# Patient Record
Sex: MALE | Race: WHITE
[De-identification: names, ages, dates, MRNs, and addresses within clinical notes are randomized per-mention and may not be internally consistent; named-entity substitution may affect disease eponyms.]

---

## 2018-06-18 ENCOUNTER — HOSPITAL ENCOUNTER (EMERGENCY)
Dept: HOSPITAL 8 - ED | Age: 63
Discharge: LEFT BEFORE DISCHARGE | End: 2018-06-18
Payer: MEDICARE

## 2018-06-18 VITALS — HEIGHT: 67 IN | BODY MASS INDEX: 27.27 KG/M2 | WEIGHT: 173.72 LBS

## 2018-06-18 VITALS — SYSTOLIC BLOOD PRESSURE: 127 MMHG | DIASTOLIC BLOOD PRESSURE: 73 MMHG

## 2018-06-18 DIAGNOSIS — Z76.0: ICD-10-CM

## 2018-06-18 DIAGNOSIS — F32.9: Primary | ICD-10-CM

## 2018-06-18 PROCEDURE — 99281 EMR DPT VST MAYX REQ PHY/QHP: CPT

## 2018-07-12 ENCOUNTER — HOSPITAL ENCOUNTER (EMERGENCY)
Dept: HOSPITAL 8 - ED | Age: 63
Discharge: HOME | End: 2018-07-12
Payer: MEDICARE

## 2018-07-12 VITALS — HEIGHT: 66 IN | BODY MASS INDEX: 26.04 KG/M2 | WEIGHT: 162.04 LBS

## 2018-07-12 VITALS — SYSTOLIC BLOOD PRESSURE: 116 MMHG | DIASTOLIC BLOOD PRESSURE: 81 MMHG

## 2018-07-12 DIAGNOSIS — Z76.0: ICD-10-CM

## 2018-07-12 DIAGNOSIS — F20.9: Primary | ICD-10-CM

## 2018-07-12 DIAGNOSIS — F32.9: ICD-10-CM

## 2018-07-12 PROCEDURE — 99283 EMERGENCY DEPT VISIT LOW MDM: CPT

## 2018-07-12 PROCEDURE — 82962 GLUCOSE BLOOD TEST: CPT

## 2018-07-30 ENCOUNTER — HOSPITAL ENCOUNTER (EMERGENCY)
Dept: HOSPITAL 8 - ED | Age: 63
Discharge: HOME | End: 2018-07-30
Payer: MEDICARE

## 2018-07-30 VITALS — DIASTOLIC BLOOD PRESSURE: 71 MMHG | SYSTOLIC BLOOD PRESSURE: 118 MMHG

## 2018-07-30 VITALS — BODY MASS INDEX: 24.22 KG/M2 | HEIGHT: 67 IN | WEIGHT: 154.32 LBS

## 2018-07-30 DIAGNOSIS — F20.9: Primary | ICD-10-CM

## 2018-07-30 DIAGNOSIS — Z76.0: ICD-10-CM

## 2018-07-30 PROCEDURE — 99283 EMERGENCY DEPT VISIT LOW MDM: CPT

## 2018-08-05 ENCOUNTER — HOSPITAL ENCOUNTER (INPATIENT)
Facility: MEDICAL CENTER | Age: 63
LOS: 1 days | DRG: 083 | End: 2018-08-06
Attending: EMERGENCY MEDICINE | Admitting: SURGERY
Payer: MEDICARE

## 2018-08-05 ENCOUNTER — APPOINTMENT (OUTPATIENT)
Dept: RADIOLOGY | Facility: MEDICAL CENTER | Age: 63
DRG: 083 | End: 2018-08-05
Attending: SURGERY
Payer: MEDICARE

## 2018-08-05 ENCOUNTER — RESOLUTE PROFESSIONAL BILLING HOSPITAL PROF FEE (OUTPATIENT)
Dept: HOSPITALIST | Facility: MEDICAL CENTER | Age: 63
End: 2018-08-05
Payer: MEDICARE

## 2018-08-05 ENCOUNTER — APPOINTMENT (OUTPATIENT)
Dept: RADIOLOGY | Facility: MEDICAL CENTER | Age: 63
DRG: 083 | End: 2018-08-05
Attending: EMERGENCY MEDICINE
Payer: MEDICARE

## 2018-08-05 ENCOUNTER — APPOINTMENT (OUTPATIENT)
Dept: RADIOLOGY | Facility: MEDICAL CENTER | Age: 63
DRG: 083 | End: 2018-08-05
Payer: MEDICARE

## 2018-08-05 DIAGNOSIS — R56.9 SEIZURE (HCC): ICD-10-CM

## 2018-08-05 DIAGNOSIS — S06.5XAA SDH (SUBDURAL HEMATOMA) (HCC): ICD-10-CM

## 2018-08-05 PROBLEM — F20.9 SCHIZOPHRENIA (HCC): Status: ACTIVE | Noted: 2018-08-05

## 2018-08-05 PROBLEM — T14.90XA TRAUMA: Status: ACTIVE | Noted: 2018-08-05

## 2018-08-05 PROBLEM — Z53.09 CONTRAINDICATION TO ANTICOAGULATION THERAPY: Status: ACTIVE | Noted: 2018-08-05

## 2018-08-05 LAB
ABO GROUP BLD: NORMAL
ABO GROUP BLD: NORMAL
ALBUMIN SERPL BCP-MCNC: 5 G/DL (ref 3.2–4.9)
ALBUMIN/GLOB SERPL: 3.1 G/DL
ALP SERPL-CCNC: 58 U/L (ref 30–99)
ALT SERPL-CCNC: 28 U/L (ref 2–50)
ANION GAP SERPL CALC-SCNC: 12 MMOL/L (ref 0–11.9)
APTT PPP: 24.3 SEC (ref 24.7–36)
AST SERPL-CCNC: 25 U/L (ref 12–45)
BILIRUB SERPL-MCNC: 0.8 MG/DL (ref 0.1–1.5)
BLD GP AB SCN SERPL QL: NORMAL
BUN SERPL-MCNC: 10 MG/DL (ref 8–22)
CALCIUM SERPL-MCNC: 9.2 MG/DL (ref 8.5–10.5)
CHLORIDE SERPL-SCNC: 101 MMOL/L (ref 96–112)
CO2 SERPL-SCNC: 23 MMOL/L (ref 20–33)
CREAT SERPL-MCNC: 0.93 MG/DL (ref 0.5–1.4)
ERYTHROCYTE [DISTWIDTH] IN BLOOD BY AUTOMATED COUNT: 42.3 FL (ref 35.9–50)
ETHANOL BLD-MCNC: 0 G/DL
GLOBULIN SER CALC-MCNC: 1.6 G/DL (ref 1.9–3.5)
GLUCOSE SERPL-MCNC: 153 MG/DL (ref 65–99)
HCT VFR BLD AUTO: 44.9 % (ref 42–52)
HGB BLD-MCNC: 16.1 G/DL (ref 14–18)
INR PPP: 1.11 (ref 0.87–1.13)
MCH RBC QN AUTO: 32.6 PG (ref 27–33)
MCHC RBC AUTO-ENTMCNC: 35.9 G/DL (ref 33.7–35.3)
MCV RBC AUTO: 90.9 FL (ref 81.4–97.8)
PLATELET # BLD AUTO: 174 K/UL (ref 164–446)
PMV BLD AUTO: 8.8 FL (ref 9–12.9)
POTASSIUM SERPL-SCNC: 3.6 MMOL/L (ref 3.6–5.5)
PROT SERPL-MCNC: 6.6 G/DL (ref 6–8.2)
PROTHROMBIN TIME: 14 SEC (ref 12–14.6)
RBC # BLD AUTO: 4.94 M/UL (ref 4.7–6.1)
RH BLD: NORMAL
RH BLD: NORMAL
SODIUM SERPL-SCNC: 136 MMOL/L (ref 135–145)
WBC # BLD AUTO: 9 K/UL (ref 4.8–10.8)

## 2018-08-05 PROCEDURE — 86900 BLOOD TYPING SEROLOGIC ABO: CPT

## 2018-08-05 PROCEDURE — 99233 SBSQ HOSP IP/OBS HIGH 50: CPT | Performed by: SURGERY

## 2018-08-05 PROCEDURE — 700102 HCHG RX REV CODE 250 W/ 637 OVERRIDE(OP): Performed by: SURGERY

## 2018-08-05 PROCEDURE — G0390 TRAUMA RESPONS W/HOSP CRITI: HCPCS

## 2018-08-05 PROCEDURE — 70450 CT HEAD/BRAIN W/O DYE: CPT

## 2018-08-05 PROCEDURE — 94760 N-INVAS EAR/PLS OXIMETRY 1: CPT

## 2018-08-05 PROCEDURE — 85730 THROMBOPLASTIN TIME PARTIAL: CPT

## 2018-08-05 PROCEDURE — A9270 NON-COVERED ITEM OR SERVICE: HCPCS | Performed by: SURGERY

## 2018-08-05 PROCEDURE — 99291 CRITICAL CARE FIRST HOUR: CPT

## 2018-08-05 PROCEDURE — 85610 PROTHROMBIN TIME: CPT

## 2018-08-05 PROCEDURE — 85027 COMPLETE CBC AUTOMATED: CPT

## 2018-08-05 PROCEDURE — 770022 HCHG ROOM/CARE - ICU (200)

## 2018-08-05 PROCEDURE — 80307 DRUG TEST PRSMV CHEM ANLYZR: CPT

## 2018-08-05 PROCEDURE — 72125 CT NECK SPINE W/O DYE: CPT

## 2018-08-05 PROCEDURE — 80053 COMPREHEN METABOLIC PANEL: CPT

## 2018-08-05 PROCEDURE — 51798 US URINE CAPACITY MEASURE: CPT

## 2018-08-05 PROCEDURE — 86901 BLOOD TYPING SEROLOGIC RH(D): CPT

## 2018-08-05 PROCEDURE — 700111 HCHG RX REV CODE 636 W/ 250 OVERRIDE (IP): Performed by: NEUROLOGICAL SURGERY

## 2018-08-05 PROCEDURE — 86850 RBC ANTIBODY SCREEN: CPT

## 2018-08-05 PROCEDURE — 700105 HCHG RX REV CODE 258: Performed by: NEUROLOGICAL SURGERY

## 2018-08-05 RX ORDER — HALOPERIDOL 5 MG/1
10 TABLET ORAL
Status: DISCONTINUED | OUTPATIENT
Start: 2018-08-05 | End: 2018-08-06 | Stop reason: HOSPADM

## 2018-08-05 RX ORDER — HALOPERIDOL 10 MG/1
10 TABLET ORAL
COMMUNITY

## 2018-08-05 RX ORDER — ENEMA 19; 7 G/133ML; G/133ML
1 ENEMA RECTAL
Status: DISCONTINUED | OUTPATIENT
Start: 2018-08-05 | End: 2018-08-06 | Stop reason: HOSPADM

## 2018-08-05 RX ORDER — OXYCODONE HYDROCHLORIDE 5 MG/1
5 TABLET ORAL EVERY 4 HOURS PRN
Status: DISCONTINUED | OUTPATIENT
Start: 2018-08-05 | End: 2018-08-06 | Stop reason: HOSPADM

## 2018-08-05 RX ORDER — ONDANSETRON 2 MG/ML
4 INJECTION INTRAMUSCULAR; INTRAVENOUS EVERY 4 HOURS PRN
Status: DISCONTINUED | OUTPATIENT
Start: 2018-08-05 | End: 2018-08-06 | Stop reason: HOSPADM

## 2018-08-05 RX ORDER — AMOXICILLIN 250 MG
1 CAPSULE ORAL
Status: DISCONTINUED | OUTPATIENT
Start: 2018-08-05 | End: 2018-08-06 | Stop reason: HOSPADM

## 2018-08-05 RX ORDER — AMOXICILLIN 250 MG
1 CAPSULE ORAL NIGHTLY
Status: DISCONTINUED | OUTPATIENT
Start: 2018-08-05 | End: 2018-08-06 | Stop reason: HOSPADM

## 2018-08-05 RX ORDER — ACETAMINOPHEN 325 MG/1
650 TABLET ORAL EVERY 4 HOURS PRN
Status: DISCONTINUED | OUTPATIENT
Start: 2018-08-05 | End: 2018-08-06 | Stop reason: HOSPADM

## 2018-08-05 RX ORDER — BISACODYL 10 MG
10 SUPPOSITORY, RECTAL RECTAL
Status: DISCONTINUED | OUTPATIENT
Start: 2018-08-05 | End: 2018-08-06 | Stop reason: HOSPADM

## 2018-08-05 RX ORDER — DOCUSATE SODIUM 100 MG/1
100 CAPSULE, LIQUID FILLED ORAL 2 TIMES DAILY
Status: DISCONTINUED | OUTPATIENT
Start: 2018-08-05 | End: 2018-08-06 | Stop reason: HOSPADM

## 2018-08-05 RX ADMIN — SODIUM CHLORIDE 500 MG: 9 INJECTION, SOLUTION INTRAVENOUS at 12:32

## 2018-08-05 RX ADMIN — HALOPERIDOL 10 MG: 5 TABLET ORAL at 20:08

## 2018-08-05 RX ADMIN — SODIUM CHLORIDE 500 MG: 9 INJECTION, SOLUTION INTRAVENOUS at 17:49

## 2018-08-05 RX ADMIN — SERTRALINE 50 MG: 50 TABLET, FILM COATED ORAL at 09:21

## 2018-08-05 ASSESSMENT — ENCOUNTER SYMPTOMS
COUGH: 0
ABDOMINAL PAIN: 0
PHOTOPHOBIA: 0
CHILLS: 0
BLURRED VISION: 0
HEMOPTYSIS: 0
DIARRHEA: 0
BACK PAIN: 0
WEAKNESS: 0
DIAPHORESIS: 0
WHEEZING: 0
ORTHOPNEA: 0
MYALGIAS: 0
NAUSEA: 0
DOUBLE VISION: 0
FEVER: 0
PALPITATIONS: 0
CONSTIPATION: 0
SHORTNESS OF BREATH: 0
VOMITING: 0
HEARTBURN: 0
SINUS PAIN: 0
NECK PAIN: 0
WEIGHT LOSS: 0
BLOOD IN STOOL: 0

## 2018-08-05 ASSESSMENT — LIFESTYLE VARIABLES
HAVE YOU EVER FELT YOU SHOULD CUT DOWN ON YOUR DRINKING: YES
TOTAL SCORE: 4
EVER_SMOKED: YES
DOES PATIENT WANT TO STOP DRINKING: NO
EVER HAD A DRINK FIRST THING IN THE MORNING TO STEADY YOUR NERVES TO GET RID OF A HANGOVER: YES
EVER FELT BAD OR GUILTY ABOUT YOUR DRINKING: YES
ALCOHOL_USE: YES
CONSUMPTION TOTAL: INCOMPLETE
HAVE PEOPLE ANNOYED YOU BY CRITICIZING YOUR DRINKING: YES

## 2018-08-05 ASSESSMENT — COGNITIVE AND FUNCTIONAL STATUS - GENERAL
SUGGESTED CMS G CODE MODIFIER MOBILITY: CI
DAILY ACTIVITIY SCORE: 21
CLIMB 3 TO 5 STEPS WITH RAILING: A LITTLE
MOBILITY SCORE: 23
TOILETING: A LITTLE
HELP NEEDED FOR BATHING: A LITTLE
SUGGESTED CMS G CODE MODIFIER DAILY ACTIVITY: CJ
PERSONAL GROOMING: A LITTLE

## 2018-08-05 ASSESSMENT — COPD QUESTIONNAIRES
IN THE PAST 12 MONTHS DO YOU DO LESS THAN YOU USED TO BECAUSE OF YOUR BREATHING PROBLEMS: DISAGREE/UNSURE
COPD SCREENING SCORE: 4
DO YOU EVER COUGH UP ANY MUCUS OR PHLEGM?: NO/ONLY WITH OCCASIONAL COLDS OR INFECTIONS
DURING THE PAST 4 WEEKS HOW MUCH DID YOU FEEL SHORT OF BREATH: NONE/LITTLE OF THE TIME
HAVE YOU SMOKED AT LEAST 100 CIGARETTES IN YOUR ENTIRE LIFE: YES

## 2018-08-05 ASSESSMENT — PAIN SCALES - GENERAL
PAINLEVEL_OUTOF10: 0
PAINLEVEL_OUTOF10: 1
PAINLEVEL_OUTOF10: 0
PAINLEVEL_OUTOF10: 1

## 2018-08-05 NOTE — H&P
TRAUMA HISTORY AND PHYSICAL    DATE OF SERVICE: 8/5/2018    ACTIVATION LEVEL: Green.     HISTORY OF PRESENT ILLNESS: The patient is a 63 year old male with a history of schizophrenia and depression who was found down seizing on the sidewalk in [facility]. The patient was triaged as a Trauma Green in accordance with established pre hospital protocols. An expeditious primary and secondary survey with required adjuncts was conducted. See Trauma Narrator for full details.     Pt  states that he does not remember anything from August 2nd to August 5th. He reports putting down a deposit on a motel with his brother on August 2nd but does not recall anything else. Pt had difficulty following instructions throughout the interview and repeatedly circled back to the event on August 2nd.      He presented with a 2cm x 2cm cephalohematoma to his left parietotemporal scalp with superficial bleeding. Head CT showed a small right contrecoup-type subdural hematoma without midline shift. He was assessed to be in stable condition.    PAST MEDICAL HISTORY:   Past Medical History:   Diagnosis Date   • Back pain    • Lumbar disc disease    • Psychiatric disorder     depression, paranoid schizophrenia.          PAST SURGICAL HISTORY: History reviewed. No pertinent surgical history.       ALLERGIES: Pcn [penicillins]       CURRENT MEDICATIONS:   Outpatient Prescriptions Marked as Taking for the 8/5/18 encounter (Hospital Encounter)   Medication Sig   • sertraline (ZOLOFT) 50 MG Tab Take 50 mg by mouth every day.   • haloperidol (HALDOL) 10 MG tablet Take 10 mg by mouth Once.         FAMILY HISTORY:   Reviewed and found to be non-contributory in regards to the above presentation    SOCIAL HISTORY:  reports that he has quit smoking. He has never used smokeless tobacco. He reports that he drinks alcohol. He reports that he does not use drugs.  Used to smoke 1PPD.    REVIEW OF SYSTEMS:   Review of Systems:  Constitutional: Negative for  fever, chills, weight loss, malaise/fatigue and diaphoresis.   HENT: Negative for hearing loss, ear pain, nosebleeds, congestion, sore throat, neck pain, tinnitus and ear discharge.    Eyes: Negative for blurred vision, double vision, photophobia, pain, discharge and redness.   Respiratory: Negative for cough, hemoptysis, sputum production, shortness of breath, wheezing and stridor.    Cardiovascular: Negative for chest pain, palpitations, orthopnea, claudication, leg swelling and PND.   Gastrointestinal: Negative for heartburn, nausea, vomiting, abdominal pain, diarrhea, constipation, blood in stool and melena.   Genitourinary: Negative for dysuria, urgency, frequency, hematuria and flank pain.   Musculoskeletal: Negative for myalgias, back pain, joint pain and falls.   Skin: Negative for itching and rash.  Neurological: Negative for dizziness, tingling, tremors, sensory change, speech change, focal weakness, seizures, loss of consciousness, weakness and headaches.   Endo/Heme/Allergies: Negative for environmental allergies and polydipsia. Does not bruise/bleed easily.   Psychiatric/Behavioral: Negative for depression, suicidal ideas, hallucinations, memory loss and substance abuse. The patient is not nervous/anxious and does not have insomnia.      PHYSICAL EXAMINATION:     GENERAL:  Otherwise healthy-appearing and in no acute distress    HEENT:    · HEAD: Small, non-expanding cephalohematoma  · EARS: Normal pinna bilaterally.  External auditory canals are without discharge. No hemotympanum.   · EYES: Conjunctivae and sclerae are clear. Extraocular movements are full. Pupils are equal, round, and reactive to light.    · NOSE: No rhinorrhea  · THROAT: Oral mucosa is moist.    FACE: The midface and jaw are stable. No malocclusion of bite is evident on visual inspection    NECK:  Soft and supple without lymphadenopathy. No masses are noted.  Trachea is midline.   There is no cervical crepitance. Palpation of the  posterior bony spine demonstrates no midline tenderness.     CHEST:  Lungs are clear to auscultation bilaterally. Symmetrical rise with respiration.  No chest wall tenderness or instability.  No crepitance.  No wounds, lacerations, or excoriations.    CARDIOVASCULAR:  Regular rate and rhythm.  No jugulo-venous distention.  Palpable pulses present in all four extremities.      ABDOMEN:  Soft, non-tender, non-distended.  Non-tympanitic.  No wounds, lacerations, or excoriations.    BACK/PELVIS:    · Thoracic Vertebrae - NON tender with palpation, no stepoffs.  · Lumbar Vertebrae - NON tender with palpation, no stepoffs.  · Sacrum - NON tender with palpation  · Pelvic Wings - NON tender with palpation    RECTAL:  Deferred    GENITOURINARY:  The patient has normal external reproductive anatomy.    EXTREMITIES:  · RIGHT ARM: Without deformities, wounds, lacerations, or excoriations.  Full passive and active range of motion without pain.  · LEFT ARM: Without deformities, wounds, lacerations, or excoriations.  Full passive and active range of motion without pain.  · RIGHT LEG: Without deformities, wounds, lacerations, or excoriations.  Full passive and active range of motion without pain.  · LEFT LEG: Without deformities, wounds, lacerations, or excoriations.  Full passive and active range of motion without pain.    NEUROLOGIC:  Sheila Coma Score 15. Cranial nerves II through XII are grossly intact. Motor and sensory exams are normal in all four extremities. Motor and sensory reflexes are 2+ and symmetric with bilateral plantar responses.    PSYCHIATRIC: Affect and mood is appropriate for age and condition.    LABORATORY VALUES:   Recent Labs      08/05/18   0613   WBC  9.0   RBC  4.94   HEMOGLOBIN  16.1   HEMATOCRIT  44.9   MCV  90.9   MCH  32.6   MCHC  35.9*   RDW  42.3   PLATELETCT  174   MPV  8.8*     Recent Labs      08/05/18   0613   SODIUM  136   POTASSIUM  3.6   CHLORIDE  101   CO2  23   GLUCOSE  153*   BUN  10    CREATININE  0.93   CALCIUM  9.2     Recent Labs      08/05/18   0613   ASTSGOT  25   ALTSGPT  28   TBILIRUBIN  0.8   ALKPHOSPHAT  58   GLOBULIN  1.6*   INR  1.11     Recent Labs      08/05/18   0613   APTT  24.3*   INR  1.11        IMAGING:   CT-CSPINE WITHOUT PLUS RECONS   Final Result      No acute fracture or listhesis of the cervical spine.      CT-HEAD W/O   Final Result      1.  Contusion of the left parietal scalp with a small contrecoup subdural hematoma of the right frontal convexity. No significant local mass effect or midline shift.   2.  No skull fractures. No CT evidence of acute infarct or mass.      CT-HEAD W/O    (Results Pending)       IMPRESSION AND PLAN:     Active Hospital Problems    Diagnosis   • Subdural hematoma (HCC) [I62.00]     Priority: High     Contusion of the left parietal scalp with a small contrecoup subdural hematoma of the right frontal convexity. No significant local mass effect or midline shift.  Follow up CT 6 hours  Keppra x 7 days  Definitive plan pending.  Franc Angeles MD. Neurosurgery.       • Seizure (HCC) [R56.9]     Priority: High     No known history   Admit BA 0.0  Work up in progress     • Contraindication to anticoagulation therapy [Z53.09]     Priority: Medium     Systemic anticoagulation contraindicated secondary to elevated bleeding risk.  Consider duplex if Lovenox not initiated within 48 hours of admission      • Trauma [T14.90XA]     Priority: Low     Found down seizing  Trauma Green activation.     • Schizophrenia (HCC) [F20.9]     Priority: Low     Chronic condition treated with Zoloft and Haldol.  Resumed maintenance medication.         DISPOSITION:  ICU.    Aggregated care time spent evaluating, reviewing documentation, providing care, and managing this patient exclusive of procedures: 40 minutes  ____________________________________   Jaycob GUNDERSON / BRIGHT     DD: 8/5/2018   DT: 8:46 AM

## 2018-08-05 NOTE — ED NOTES
ERP at bedside for patient evaluation. Patient A&O x4 and talking with provider appropriately. VSS

## 2018-08-05 NOTE — CONSULTS
DATE OF SERVICE:  08/05/2018    NEUROSURGICAL CONSULTATION    HISTORY OF PRESENT ILLNESS:  The patient is a pleasant 63-year-old male with a   history of paranoid schizophrenia, alcohol abuse and drug abuse who was found   seizing, brought to the hospital, found to have a cephalohematoma and a   subdural hematoma and neurosurgical consultation was requested.  On further   questioning, the patient has no recollection of the event.  He is complaining   of headache.  No nausea or vomiting.    PAST MEDICAL HISTORY:  As above.  Back pain as well.    PAST SURGICAL HISTORY:  None.    ALLERGIES:  PENICILLIN.    MEDICATIONS AT HOME:  Haldol and Zoloft.    SOCIAL HISTORY:  Quit smoking.  He drinks a significant amount.  No drugs now,   but he has been using crack cocaine he said recently.  VITAL SIGNS:  AVSS.  GENERAL:  A and O x3.  GCS of 15.  HEENT:  Pupils equal, round, reactive to light.  Extraocular muscles intact.    Tongue midline.  Face symmetric.  NEUROLOGIC:  Motor is 5/5 strength in all muscle groups in the upper and lower   extremities.  Sensory grossly intact to light touch.    IMAGING STUDIES:  CT scan of the brain noncontrast shows right-sided very,   very thin frontotemporal subdural with no mass effect, no shift, measuring   maybe 2 mm in thickness.  No fracture.    LABORATORY VALUES:  CBC within normal limits.  Basic metabolic panel within   normal limits, except for glucose of 153.  ETOH negative.  INR and PTT are   normal.    PLAN:  Repeat CT at 11:00 a.m.  Keppra 500 b.i.d. x7 days.  If repeat CT scan   is stable, the patient can safely go to the floor and have a diet and work   with PT and OT with the expectation that he may be able to be discharged   tomorrow or Tuesday.  We will follow closely.       ____________________________________     LUCRECIA HUMMEL MD    CPD / NTS    DD:  08/05/2018 10:46:25  DT:  08/05/2018 10:55:48    D#:  [id number]  Job#:  [id number]

## 2018-08-05 NOTE — PROGRESS NOTES
Transport at bedside to bring pt to CT. Pt ambulated and assisted to wheelchair, on transport monitor, and chart in tow.     CT transport uneventful. Pt arrival back to S-115 at 1130 back in bed and denies further needs at this time.

## 2018-08-05 NOTE — CARE PLAN
Problem: Safety  Goal: Will remain free from falls  Outcome: PROGRESSING AS EXPECTED  RN to educate pt on use of call light and bed alarm to keep pt safe and free from falls. RN to ensure all comfort measures are met such as hydration, positioning, and temperature. RN to encourage pt to use call light to alert RN of any further needs.     Problem: Pain Management  Goal: Pain level will decrease to patient's comfort goal  Outcome: PROGRESSING AS EXPECTED  RN to educate pt on pain assessment and optional interventions to help increase pt comfort. Pt denies any need for intervention at this time as pain is at a minimal level and comfort. RN to reassess with pt rounding.

## 2018-08-05 NOTE — PROGRESS NOTES
Pt belongings documented in chart. Pt alert and aware of belongings and agrees to documentation on paper. Safe keeping called to keep pt's cash safe during stay. Security called to keep cigarettes, marijuana, and roll papers during stay.

## 2018-08-05 NOTE — ED NOTES
Assuming care of patient in bed 15. Patient A&O x3 to place person, and time. Patient denies hx of seizures. Denies drug or alcohol use tonight. Patient does not know what happened. Patient remains in c-collar. Monitors in place.

## 2018-08-05 NOTE — PROGRESS NOTES
"  Trauma/Surgical Progress Note    Author: Pablo SHADY Hutchinsonkimberly Date & Time created: 8/5/2018   3:14 PM     Interval Events:  63 yom s/p seizure, fall and TBI  Hospital day #1  Pt currently requires ICU care  Seen on rounds and discussed with multidisciplinary team  Physiologic derangements preclude floor transfer  Events and interventions include  Supportive care for TBI  Nutritional support  Pain mgt  Pulmonary toilet  Review of Systems   Unable to perform ROS: psychiatric disorder     Hemodynamics:  Blood pressure 121/66, pulse (!) 58, temperature 36.3 °C (97.4 °F), resp. rate 18, height 1.676 m (5' 6\"), weight 67 kg (147 lb 11.3 oz), SpO2 99 %.     Respiratory:    Respiration: 18, Pulse Oximetry: 99 %           Fluids:    Intake/Output Summary (Last 24 hours) at 08/05/18 1514  Last data filed at 08/05/18 1400   Gross per 24 hour   Intake              980 ml   Output              650 ml   Net              330 ml     Admit Weight: 68 kg (150 lb)  Current Weight: 67 kg (147 lb 11.3 oz)    Physical Exam   Constitutional: He is oriented to person, place, and time. He appears well-developed and well-nourished. No distress.   HENT:   Head: Normocephalic and atraumatic.   Eyes: Pupils are equal, round, and reactive to light.   Neck: Normal range of motion. Neck supple. No tracheal deviation present.   Cardiovascular: Normal rate, regular rhythm and normal heart sounds.    Pulmonary/Chest: Effort normal and breath sounds normal. No respiratory distress.   Abdominal: Soft. Bowel sounds are normal. He exhibits no distension.   Musculoskeletal: Normal range of motion. He exhibits no edema or deformity.   Neurological: He is alert and oriented to person, place, and time.   Skin: Skin is warm and dry.   Psychiatric:   Unable to assess       Medical Decision Making/Problem List:    Active Hospital Problems    Diagnosis   • Subdural hematoma (HCC) [I62.00]     Priority: High     Contusion of the left parietal scalp with a small " contrecoup subdural hematoma of the right frontal convexity. No significant local mass effect or midline shift.  Follow up CT -stable with some blossoming of contusions  Keppra x 7 days  Non-operative mgt  Franc Angeles MD. Neurosurgery.       • Seizure (HCC) [R56.9]     Priority: High     No known history  Admit BA 0.0  Likely precipitated by blunt head trauma  Keppra x7 days     • Contraindication to anticoagulation therapy [Z53.09]     Priority: Medium     Systemic anticoagulation contraindicated secondary to elevated bleeding risk.  Consider duplex if Lovenox not initiated within 48 hours of admission      • Trauma [T14.90XA]     Priority: Low     Found down seizing  Trauma Green activation.     • Schizophrenia (HCC) [F20.9]     Priority: Low     Chronic condition treated with Zoloft and Haldol.  Resumed maintenance medication.       Core Measures & Quality Metrics:  Medications reviewed, Labs reviewed and Radiology images reviewed  Arora catheter: Critically Ill - Requiring Accurate Measurement of Urinary Output      DVT Prophylaxis: Contraindicated - High bleeding risk    Ulcer prophylaxis: Not indicated        EDWARD Score  Discussed patient condition with RN, RT and Pharmacy.      Assessment/Plan  No new Assessment & Plan notes have been filed under this hospital service since the last note was generated.  Service: Surgery General

## 2018-08-05 NOTE — PROGRESS NOTES
Patient arrived to S-115. Patient settled in room. Denies pain. Called brother of the patient to update him per the patients request.     2 RN skin check completed. Photos taken of head abrasion/swelling. Blanchable redness noted on sacrum/buttox. Mepilex placed.     Call bell within reach. Patient denies any other needs at this time.

## 2018-08-05 NOTE — ED PROVIDER NOTES
"ED Provider Note    CHIEF COMPLAINT  Chief Complaint   Patient presents with   • Trauma Green     GLF / head injury       HPI  Chandni Hess is a 63 y.o. male who presents as a trauma green.  The patient was found seizing on the sidewalk in [facility].  Per medics he is now awake and alert.  Denies any history of seizure disorder.  He has no recollection as far as what happened.  He has no complaints of pain.  He states he is not a drinker.  Medics do not know how many or how long the seizure activity lasted.    REVIEW OF SYSTEMS  See HPI for further details. All other systems negative.    PAST MEDICAL HISTORY  No past medical history on file.    FAMILY HISTORY  No family history on file.    SOCIAL HISTORY  Social History     Social History   • Marital status: N/A     Spouse name: N/A   • Number of children: N/A   • Years of education: N/A     Social History Main Topics   • Smoking status: Not on file   • Smokeless tobacco: Not on file   • Alcohol use Not on file   • Drug use: Unknown   • Sexual activity: Not on file     Other Topics Concern   • Not on file     Social History Narrative   • No narrative on file       SURGICAL HISTORY  No past surgical history on file.    CURRENT MEDICATIONS  Home Medications    **Home medications have not yet been reviewed for this encounter**         ALLERGIES  Allergies not on file    PHYSICAL EXAM  VITAL SIGNS: /75   Pulse 83   Temp (!) 35.6 °C (96.1 °F)   Resp (!) 22   Ht 1.676 m (5' 6\")   Wt 68 kg (150 lb)   SpO2 97%   BMI 24.21 kg/m²   Constitutional: Well developed, Well nourished, No acute distress, Non-toxic appearance.   HENT: Normocephalic, abrasion and slight swelling to the left occipital region, Oropharynx moist with no trauma,   Eyes: PERRL, EOMI, Conjunctiva normal, No discharge.   Neck: Trachea is midline and c-collar is left in place.  Cardiovascular: Normal heart rate, Normal rhythm, No murmurs, No rubs, No gallops.   Thorax & Lungs: Clear to " auscultation without wheezes, rales, or rhonchi. No chest tenderness.  Pelvis is stable and nontender.  Abdomen: Soft nontender.  Skin: Warm, Dry.  Musculoskeletal: Good range of motion in all major joints. No tenderness to palpation or major deformities noted.   Neurologic: Awake and alert but amnestic for events surrounding his seizure, Normal motor function, Normal sensory function, No focal deficits noted.       RADIOLOGY/PROCEDURES  CT-CSPINE WITHOUT PLUS RECONS   Final Result      No acute fracture or listhesis of the cervical spine.      CT-HEAD W/O   Final Result      1.  Contusion of the left parietal scalp with a small contrecoup subdural hematoma of the right frontal convexity. No significant local mass effect or midline shift.   2.  No skull fractures. No CT evidence of acute infarct or mass.            COURSE & MEDICAL DECISION MAKING  Pertinent Labs & Imaging studies reviewed. (See chart for details)  This is a 63-year-old here for evaluation after being found on the sidewalk having a seizure.  On exam he is neurologically intact.  He is amnestic for events surrounding the seizure.  He states he has no history of seizure disorder.  He states he is not a drinker.  At this point he states he feels absolutely fine.  I was contacted by the radiologist and we have discussed and reviewed his CT scan of his head.  This shows a left occipital region scalp hematoma and a right sided contrecoup subdural hematoma.  Cervical spine shows no evidence of acute abnormalities.  Laboratory workup shows alcohol to be 0.  Chemistries are normal with the exception of a glucose of 153.  INR is normal.  CBC shows normal white count with normal hemoglobin and platelet count.  I discussed the results of the studies with the patient.  He understands that he will require admission.  I have contacted Dr. Angeles who is on-call for neurosurgery and he will consult on the case.  He is requested the patient be admitted to the trauma  service.  I will discuss the case with the trauma surgeon for admission to the trauma ICU.    FINAL IMPRESSION  1.  Seizure  2.  Subdural hematoma  3.  Scalp hematoma         Electronically signed by: Adolph Barlow, 8/5/2018 6:15 AM

## 2018-08-05 NOTE — NON-PROVIDER
TRAUMA HISTORY AND PHYSICAL    DATE OF SERVICE: 8/5/2018    ACTIVATION LEVEL: Green.     HISTORY OF PRESENT ILLNESS: The patient is a 63 year old male with a history of schizophrenia and depression who was found down seizing on the sidewalk in [facility]. The patient was triaged as a Trauma Green in accordance with established pre hospital protocols. An expeditious primary and secondary survey with required adjuncts was conducted. See Trauma Narrator for full details.    Pt  states that he does not remember anything from August 2nd to August 5th. He reports putting down a deposit on a motel with his brother on August 2nd but does not recall anything else. Pt had difficulty following instructions throughout the interview and repeatedly circled back to the event on August 2nd.     He presented with a 2cm x 2cm cephalohematoma to his left parietotemporal scalp with superficial bleeding. Head CT showed a small right contrecoup-type subdural hematoma without midline shift. He was assessed to be in stable condition.    PAST MEDICAL HISTORY:   Past Medical History:   Diagnosis Date   • Back pain    • Lumbar disc disease    • Psychiatric disorder     depression, paranoid schizophrenia.          PAST SURGICAL HISTORY: History reviewed. No pertinent surgical history.       ALLERGIES: Pcn [penicillins]  Unable to obtain due to patient condition and No significant history of allergies     CURRENT MEDICATIONS:   Outpatient Prescriptions Marked as Taking for the 8/5/18 encounter (Hospital Encounter)   Medication Sig   • sertraline (ZOLOFT) 50 MG Tab Take 50 mg by mouth every day.   • haloperidol (HALDOL) 10 MG tablet Take 10 mg by mouth Once.         FAMILY HISTORY:   Reviewed and found to be non-contributory in regards to the above presentation    SOCIAL HISTORY:   Pt denied a recent hx of alcohol use but endorsed pack per day tobacco smoking and frequent marijuana use.    REVIEW OF SYSTEMS:   Review of Systems    Constitutional: Negative for chills, diaphoresis, fever, malaise/fatigue and weight loss.   HENT: Negative for congestion, ear discharge, ear pain, nosebleeds and sinus pain.    Eyes: Negative for blurred vision, double vision and photophobia.   Respiratory: Negative for cough, hemoptysis, shortness of breath and wheezing.    Cardiovascular: Negative for chest pain, palpitations and orthopnea.   Gastrointestinal: Negative for abdominal pain, blood in stool, constipation, diarrhea, heartburn, nausea and vomiting.   Genitourinary: Negative for dysuria, frequency and urgency.   Musculoskeletal: Negative for back pain, joint pain, myalgias and neck pain.   Skin: Negative for itching and rash.   Neurological: Negative for weakness.         PHYSICAL EXAMINATION:     GENERAL:  Otherwise healthy-appearing and in no acute distress    HEENT:    · HEAD: 2x2cm left parietotemporal cephalohematoma with superficial abraisions, normocephalic.    · EARS: Normal pinna bilaterally.  External auditory canals are without discharge. No hemotympanum.   · EYES: Conjunctivae and sclerae are clear. Extraocular movements are full. Pupils are equal, round, and reactive to light.    · NOSE: No rhinorrhea  · THROAT: Oral mucosa is moist.    FACE: The midface and jaw are stable. No malocclusion of bite is evident on visual inspection    NECK:  Soft and supple without lymphadenopathy. No masses are noted.  Trachea is midline.  There is no cervical crepitance. Palpation of the posterior bony spine demonstrates no midline tenderness.     CHEST:  Lungs are clear to auscultation bilaterally. Symmetrical rise with respiration.  No chest wall tenderness or instability.  No crepitance.  No wounds, lacerations, or excoriations.    CARDIOVASCULAR:  Regular rate and rhythm.  No jugulo-venous distention.  Palpable pulses present in all four extremities.      ABDOMEN:  Soft, non-tender, non-distended.  Non-tympanitic.  No wounds, lacerations, or  excoriations.    BACK/PELVIS:    · Thoracic Vertebrae - non tender with palpation, no stepoffs.  · Lumbar Vertebrae - non tender with palpation, no stepoffs.  · Sacrum - non tender with palpation  · Pelvic Wings - non tender with palpation    RECTAL:  Deferred    GENITOURINARY:  The patient has normal external reproductive anatomy.    EXTREMITIES:  · RIGHT ARM: Without deformities, wounds, lacerations, or excoriations.  Full passive and active range of motion without pain.  · LEFT ARM: Without deformities, wounds, lacerations, or excoriations.  Full passive and active range of motion without pain.  · RIGHT LEG: Without deformities, wounds, lacerations, or excoriations.  Full passive and active range of motion without pain.  · LEFT LEG: Without deformities, wounds, lacerations, or excoriations.  Full passive and active range of motion without pain.    NEUROLOGIC:  Sheila Coma Score 15. Cranial nerves II through XII are grossly intact. Motor and sensory exams are normal in all four extremities. Motor and sensory reflexes are 2+ and symmetric with bilateral plantar responses. Pt is alert and oriented.    PSYCHIATRIC: Affect and mood is appropriate for age and condition.    LABORATORY VALUES:   Recent Labs      08/05/18 0613   WBC  9.0   RBC  4.94   HEMOGLOBIN  16.1   HEMATOCRIT  44.9   MCV  90.9   MCH  32.6   MCHC  35.9*   RDW  42.3   PLATELETCT  174   MPV  8.8*     Recent Labs      08/05/18 0613   SODIUM  136   POTASSIUM  3.6   CHLORIDE  101   CO2  23   GLUCOSE  153*   BUN  10   CREATININE  0.93   CALCIUM  9.2     Recent Labs      08/05/18 0613   ASTSGOT  25   ALTSGPT  28   TBILIRUBIN  0.8   ALKPHOSPHAT  58   GLOBULIN  1.6*   INR  1.11     Recent Labs      08/05/18 0613   APTT  24.3*   INR  1.11        IMAGING:   CT-CSPINE WITHOUT PLUS RECONS   Final Result      No acute fracture or listhesis of the cervical spine.      CT-HEAD W/O   Final Result      1.  Contusion of the left parietal scalp with a small  contrecoup subdural hematoma of the right frontal convexity. No significant local mass effect or midline shift.   2.  No skull fractures. No CT evidence of acute infarct or mass.          IMPRESSION AND PLAN:     Active Hospital Problems    Diagnosis   • Subdural hematoma (HCC) [I62.00]     Priority: High     Contusion of the left parietal scalp with a small contrecoup subdural hematoma of the right frontal convexity. No significant local mass effect or midline shift.  Follow up CT 6 hours  Keppra x 7 days  Definitive plan pending.  Franc Angeles MD. Neurosurgery.       • Seizure (HCC) [R56.9]     Priority: High     No known history   Admit BA 0.0  Work up in progress     • Contraindication to anticoagulation therapy [Z53.09]     Priority: Medium     Systemic anticoagulation contraindicated secondary to elevated bleeding risk.  Consider duplex if Lovenox not initiated within 48 hours of admission      • Trauma [T14.90XA]     Priority: Low     Found down seizing  Trauma Green activation.     • Schizophrenia (HCC) [F20.9]     Priority: Low     Chronic condition treated with Zoloft and Haldol.  Resumed maintenance medication.       Pt is a 63 year old male with hx significant for schizophrenia and depression who presented to the ED as a trauma green after being found down seizing on the sidewalk in [facility]. Pt physical exam was significant for 2x2cm left cephalohematoma. Subsequent head CT showed a small right subdural hematoma without midline shift. Pt does not appear to have depressed mental status or neurologic deficits secondary to this injury.    Plan  Acute right subdural hematoma w/o midline shift  -admit to [facility] for Q[facility] neuro checks  -f/u CT head in 4-6hrs to assess stability of bleed  -Keppra 500mg BID for seizure prophylaxis    Schizophrenia  -no indication for psych consult at this time  -restart haloperidol and zoloft      DISPOSITION:  SICU for close neurologic monitoring.

## 2018-08-05 NOTE — ED NOTES
Patient resting comfortably w/ VSS; denies needs at this time, updated on POC; awaiting bed assignment.

## 2018-08-05 NOTE — PROGRESS NOTES
Re-attempt made to call and update pt's brother, Scot on pt's current status per pt. No answer. Voicemail left with direct number to pod.

## 2018-08-06 VITALS
HEIGHT: 66 IN | HEART RATE: 64 BPM | SYSTOLIC BLOOD PRESSURE: 121 MMHG | RESPIRATION RATE: 18 BRPM | TEMPERATURE: 97.9 F | BODY MASS INDEX: 23.84 KG/M2 | DIASTOLIC BLOOD PRESSURE: 66 MMHG | OXYGEN SATURATION: 97 % | WEIGHT: 148.37 LBS

## 2018-08-06 LAB
ANION GAP SERPL CALC-SCNC: 9 MMOL/L (ref 0–11.9)
BASOPHILS # BLD AUTO: 0.3 % (ref 0–1.8)
BASOPHILS # BLD: 0.03 K/UL (ref 0–0.12)
BUN SERPL-MCNC: 9 MG/DL (ref 8–22)
CALCIUM SERPL-MCNC: 9.2 MG/DL (ref 8.5–10.5)
CHLORIDE SERPL-SCNC: 103 MMOL/L (ref 96–112)
CO2 SERPL-SCNC: 24 MMOL/L (ref 20–33)
CREAT SERPL-MCNC: 0.65 MG/DL (ref 0.5–1.4)
EOSINOPHIL # BLD AUTO: 0.21 K/UL (ref 0–0.51)
EOSINOPHIL NFR BLD: 2.4 % (ref 0–6.9)
ERYTHROCYTE [DISTWIDTH] IN BLOOD BY AUTOMATED COUNT: 42.5 FL (ref 35.9–50)
GLUCOSE SERPL-MCNC: 109 MG/DL (ref 65–99)
HCT VFR BLD AUTO: 47.1 % (ref 42–52)
HGB BLD-MCNC: 16.4 G/DL (ref 14–18)
IMM GRANULOCYTES # BLD AUTO: 0.04 K/UL (ref 0–0.11)
IMM GRANULOCYTES NFR BLD AUTO: 0.5 % (ref 0–0.9)
LYMPHOCYTES # BLD AUTO: 2.02 K/UL (ref 1–4.8)
LYMPHOCYTES NFR BLD: 23.2 % (ref 22–41)
MCH RBC QN AUTO: 31.7 PG (ref 27–33)
MCHC RBC AUTO-ENTMCNC: 34.8 G/DL (ref 33.7–35.3)
MCV RBC AUTO: 90.9 FL (ref 81.4–97.8)
MONOCYTES # BLD AUTO: 0.51 K/UL (ref 0–0.85)
MONOCYTES NFR BLD AUTO: 5.9 % (ref 0–13.4)
NEUTROPHILS # BLD AUTO: 5.89 K/UL (ref 1.82–7.42)
NEUTROPHILS NFR BLD: 67.7 % (ref 44–72)
NRBC # BLD AUTO: 0 K/UL
NRBC BLD-RTO: 0 /100 WBC
PLATELET # BLD AUTO: 169 K/UL (ref 164–446)
PMV BLD AUTO: 8.9 FL (ref 9–12.9)
POTASSIUM SERPL-SCNC: 4.2 MMOL/L (ref 3.6–5.5)
RBC # BLD AUTO: 5.18 M/UL (ref 4.7–6.1)
SODIUM SERPL-SCNC: 136 MMOL/L (ref 135–145)
WBC # BLD AUTO: 8.7 K/UL (ref 4.8–10.8)

## 2018-08-06 PROCEDURE — G9167 ATTEN D/C STATUS: HCPCS | Mod: CI

## 2018-08-06 PROCEDURE — 700112 HCHG RX REV CODE 229: Performed by: SURGERY

## 2018-08-06 PROCEDURE — 92523 SPEECH SOUND LANG COMPREHEN: CPT

## 2018-08-06 PROCEDURE — A9270 NON-COVERED ITEM OR SERVICE: HCPCS | Performed by: SURGERY

## 2018-08-06 PROCEDURE — G9165 ATTEN CURRENT STATUS: HCPCS | Mod: CI

## 2018-08-06 PROCEDURE — 700105 HCHG RX REV CODE 258: Performed by: NEUROLOGICAL SURGERY

## 2018-08-06 PROCEDURE — 700102 HCHG RX REV CODE 250 W/ 637 OVERRIDE(OP): Performed by: SURGERY

## 2018-08-06 PROCEDURE — 85025 COMPLETE CBC W/AUTO DIFF WBC: CPT

## 2018-08-06 PROCEDURE — 80048 BASIC METABOLIC PNL TOTAL CA: CPT

## 2018-08-06 PROCEDURE — G9166 ATTEN GOAL STATUS: HCPCS | Mod: CI

## 2018-08-06 PROCEDURE — 700111 HCHG RX REV CODE 636 W/ 250 OVERRIDE (IP): Performed by: NEUROLOGICAL SURGERY

## 2018-08-06 RX ORDER — LEVETIRACETAM 500 MG/1
500 TABLET ORAL 2 TIMES DAILY
Qty: 12 TAB | Refills: 0 | Status: SHIPPED | OUTPATIENT
Start: 2018-08-06 | End: 2018-08-12

## 2018-08-06 RX ORDER — LEVETIRACETAM 500 MG/1
500 TABLET ORAL 2 TIMES DAILY
Status: DISCONTINUED | OUTPATIENT
Start: 2018-08-06 | End: 2018-08-06 | Stop reason: HOSPADM

## 2018-08-06 RX ADMIN — DOCUSATE SODIUM 100 MG: 100 CAPSULE, LIQUID FILLED ORAL at 04:58

## 2018-08-06 RX ADMIN — SODIUM CHLORIDE 500 MG: 9 INJECTION, SOLUTION INTRAVENOUS at 04:58

## 2018-08-06 RX ADMIN — SERTRALINE 50 MG: 50 TABLET, FILM COATED ORAL at 04:58

## 2018-08-06 ASSESSMENT — ENCOUNTER SYMPTOMS
RESPIRATORY NEGATIVE: 1
CONSTITUTIONAL NEGATIVE: 1
HEADACHES: 0
PSYCHIATRIC NEGATIVE: 1
DIZZINESS: 0
MUSCULOSKELETAL NEGATIVE: 1

## 2018-08-06 ASSESSMENT — PAIN SCALES - GENERAL
PAINLEVEL_OUTOF10: 0

## 2018-08-06 NOTE — PROGRESS NOTES
Neurosurgery Progress Note    Subjective:  Doing well, no acute events, up in chair, eating, doan    Exam:  Aaox3, perrl, eom's intact, face sym, tongue ML, str 5/5, no drift    Pulse  Av  Min: 45  Max: 70  Resp  Av.4  Min: 12  Max: 27  Temp  Av.7 °C (98.1 °F)  Min: 36.3 °C (97.4 °F)  Max: 37.3 °C (99.1 °F)  SpO2  Av.2 %  Min: 92 %  Max: 100 %    No Data Recorded    Recent Labs      18   0618   0515   WBC  9.0  8.7   RBC  4.94  5.18   HEMOGLOBIN  16.1  16.4   HEMATOCRIT  44.9  47.1   MCV  90.9  90.9   MCH  32.6  31.7   MCHC  35.9*  34.8   RDW  42.3  42.5   PLATELETCT  174  169   MPV  8.8*  8.9*     Recent Labs      1813  18   0515   SODIUM  136  136   POTASSIUM  3.6  4.2   CHLORIDE  101  103   CO2  23  24   GLUCOSE  153*  109*   BUN  10  9   CREATININE  0.93  0.65   CALCIUM  9.2  9.2     Recent Labs      1813   APTT  24.3*   INR  1.11           Intake/Output       18 0700 - 1859 18 0700 - 18 0659      2810-6535 0497-9533 Total  5172-2741 Total       Intake    P.O.  1320  240 1560  --  -- --    P.O. 8339 469 6997 -- -- --    I.V.  200  100 300  --  -- --    IV Piggyback Volume (Keppra) 200 100 300 -- -- --    Total Intake 3626 760 9930 -- -- --       Output    Urine  1500  1465 2965  --  -- --    Output (mL) (Urinary Catheter Indwelling Catheter 16 fr) 1500 1465 2965 -- -- --    Stool  0  -- 0  --  -- --    Number of Times Stooled 1 x -- 1 x -- -- --    Measurable Stool (mL) 0 -- 0 -- -- --    Total Output 1500 1465 2965 -- -- --       Net I/O     20 -6924 -1109 -- -- --            Intake/Output Summary (Last 24 hours) at 18 0807  Last data filed at 18 0600   Gross per 24 hour   Intake             1860 ml   Output             2965 ml   Net            -1105 ml       $ Bladder Scan Results (mL): 776    • Pharmacy Consult Request  1 Each PRN   • docusate sodium  100 mg BID   • senna-docusate  1 Tab Nightly    • senna-docusate  1 Tab Q24HRS PRN   • bisacodyl  10 mg Q24HRS PRN   • fleet  1 Each Once PRN   • acetaminophen  650 mg Q4HRS PRN   • oxyCODONE immediate-release  5 mg Q4HRS PRN   • ondansetron  4 mg Q4HRS PRN   • haloperidol  10 mg QHS   • sertraline  50 mg QAM   • levETIRAcetam (KEPPRA) IV  500 mg Q12HRS       Assessment and Plan:  Hospital day #1  right-sided very, very thin frontotemporal subdural with no mass effect, no shift, measuring   maybe 2 mm in thickness.  No fracture.  POD #n/a    Doing well  Ok to transfer to floor from NS standpoint  Home when cleared by trauma  F/u in our office in 2 weeks w/ repeat head CT  keppra 500mg BID x 7 days  No ASA, NSAIDs, anticoags, antiplatelets    ATTENDING ADDENDUM:  Patient seen independently and agree with above note  proph lovenox ok tonight

## 2018-08-06 NOTE — PROGRESS NOTES
RN attempted to call and update Pt brother, Scot, with no answer.  RN will continue to try and contact

## 2018-08-06 NOTE — DISCHARGE INSTRUCTIONS
Discharge Instructions    Discharged to bus stop by registered nurse with wheelchair. Discharged via wheel chair, hospital escort: registered nurse.  Special equipment needed: none  Be sure to schedule a follow-up appointment with your primary care doctor or any specialists as instructed.     Discharge Plan:   Smoking Cessation Offered: Patient Counseled  Influenza Vaccine Indication: Possibly indicated: Contact facility to determine if vaccine previously given (pt unsure if received prior)    I understand that a diet low in cholesterol, fat, and sodium is recommended for good health. Unless I have been given specific instructions below for another diet, I accept this instruction as my diet prescription.       Depression / Suicide Risk    As you are discharged from this [facility] facility, it is important to learn how to keep safe from harming yourself.    Recognize the warning signs:  Abrupt changes in personality, positive or negative- including increase in energy   Giving away possessions  Change in eating patterns- significant weight changes-  positive or negative  Change in sleeping patterns- unable to sleep or sleeping all the time   Unwillingness or inability to communicate  Depression  Unusual sadness, discouragement and loneliness  Talk of wanting to die  Neglect of personal appearance   Rebelliousness- reckless behavior  Withdrawal from people/activities they love  Confusion- inability to concentrate     If you or a loved one observes any of these behaviors or has concerns about self-harm, here's what you can do:  Talk about it- your feelings and reasons for harming yourself  Remove any means that you might use to hurt yourself (examples: pills, rope, extension cords, firearm)  Get professional help from the community (Mental Health, Substance Abuse, psychological counseling)  Do not be alone:Call your Safe Contact- someone whom you trust who will be there for you.  Call your local CRISIS HOTLINE  [phone] or [phone]  Call your local Children's Mobile Crisis Response Team [place] (031) 809-3001 or www.BuyMyTronics.com  Call the toll free National Suicide Prevention Hotlines   National Suicide Prevention Lifeline 659-637-OKVZ (5205)  National Hope Line Network 800-SUICIDE (781-8376)    Levetiracetam tablets  What is this medicine?  LEVETIRACETAM (donavon barr) is an antiepileptic drug. It is used with other medicines to treat certain types of seizures.  This medicine may be used for other purposes; ask your health care provider or pharmacist if you have questions.  COMMON BRAND NAME(S): Ifrah Najera  What should I tell my health care provider before I take this medicine?  They need to know if you have any of these conditions:  -kidney disease  -suicidal thoughts, plans, or attempt; a previous suicide attempt by you or a family member  -an unusual or allergic reaction to levetiracetam, other medicines, foods, dyes, or preservatives  -pregnant or trying to get pregnant  -breast-feeding  How should I use this medicine?  Take this medicine by mouth with a glass of water. Follow the directions on the prescription label. Swallow the tablets whole. Do not crush or chew this medicine. You may take this medicine with or without food. Take your doses at regular intervals. Do not take your medicine more often than directed. Do not stop taking this medicine or any of your seizure medicines unless instructed by your doctor or health care professional. Stopping your medicine suddenly can increase your seizures or their severity.  A special MedGuide will be given to you by the pharmacist with each prescription and refill. Be sure to read this information carefully each time.  Contact your pediatrician or health care professional regarding the use of this medication in children. While this drug may be prescribed for children as young as 4 years of age for selected conditions, precautions do  apply.  Overdosage: If you think you have taken too much of this medicine contact a poison control center or emergency room at once.  NOTE: This medicine is only for you. Do not share this medicine with others.  What if I miss a dose?  If you miss a dose, take it as soon as you can. If it is almost time for your next dose, take only that dose. Do not take double or extra doses.  What may interact with this medicine?  This medicine may interact with the following medications:  -carbamazepine  -colesevelam  -probenecid  -sevelamer  This list may not describe all possible interactions. Give your health care provider a list of all the medicines, herbs, non-prescription drugs, or dietary supplements you use. Also tell them if you smoke, drink alcohol, or use illegal drugs. Some items may interact with your medicine.  What should I watch for while using this medicine?  Visit your doctor or health care professional for a regular check on your progress. Wear a medical identification bracelet or chain to say you have epilepsy, and carry a card that lists all your medications.  It is important to take this medicine exactly as instructed by your health care professional. When first starting treatment, your dose may need to be adjusted. It may take weeks or months before your dose is stable. You should contact your doctor or health care professional if your seizures get worse or if you have any new types of seizures.  You may get drowsy or dizzy. Do not drive, use machinery, or do anything that needs mental alertness until you know how this medicine affects you. Do not stand or sit up quickly, especially if you are an older patient. This reduces the risk of dizzy or fainting spells. Alcohol may interfere with the effect of this medicine. Avoid alcoholic drinks.  The use of this medicine may increase the chance of suicidal thoughts or actions. Pay special attention to how you are responding while on this medicine. Any worsening  of mood, or thoughts of suicide or dying should be reported to your health care professional right away.  Women who become pregnant while using this medicine may enroll in the [place] Antiepileptic Drug Pregnancy Registry by calling 1-485.666.4554. This registry collects information about the safety of antiepileptic drug use during pregnancy.  What side effects may I notice from receiving this medicine?  Side effects that you should report to your doctor or health care professional as soon as possible:  -allergic reactions like skin rash, itching or hives, swelling of the face, lips, or tongue  -breathing problems  -dark urine  -general ill feeling or flu-like symptoms  -problems with balance, talking, walking  -unusually weak or tired  -worsening of mood, thoughts or actions of suicide or dying  -yellowing of the eyes or skin  Side effects that usually do not require medical attention (report to your doctor or health care professional if they continue or are bothersome):  -diarrhea  -dizzy, drowsy  -headache  -loss of appetite  This list may not describe all possible side effects. Call your doctor for medical advice about side effects. You may report side effects to FDA at 3-385-FDA-4644.  Where should I keep my medicine?  Keep out of reach of children.  Store at room temperature between 15 and 30 degrees C (59 and 86 degrees F). Throw away any unused medicine after the expiration date.  NOTE: This sheet is a summary. It may not cover all possible information. If you have questions about this medicine, talk to your doctor, pharmacist, or health care provider.  © 2018 Elsevier/Gold Standard (2017-01-19 09:43:54)  Subdural Hematoma  A subdural hematoma is a collection of blood between the brain and its tough outermost membrane covering (the dura).  Blood clots that form in this area push down on the brain and cause irritation. A subdural hematoma may cause parts of the brain to stop working and eventually  cause death.   CAUSES  A subdural hematoma is caused by bleeding from a ruptured blood vessel (hemorrhage). The bleeding results from trauma to the head, such as from a fall or motor vehicle accident.  There are two types of subdural hemorrhages:  · Acute. This type develops shortly after a serious blow to the head and causes blood to collect very quickly. If not diagnosed and treated promptly, severe brain injury or death can occur.  · Chronic. This is when bleeding develops more slowly, over weeks or months.  RISK FACTORS  People at risk for subdural hematoma include older persons, infants, and alcoholics.  SYMPTOMS  An acute subdural hemorrhage develops over minutes to hours. Symptoms can include:  · Temporary loss of consciousness.  · Weakness of arms or legs on one side of the body.  · Changes in vision or speech.  · A severe headache.  · Seizures.  · Nausea and vomiting.  · Increased sleepiness.  A chronic subdural hemorrhage develops over weeks to months. Symptoms may develop slowly and produce less noticeable problems or changes. Symptoms include:  · A mild headache.  · A change in personality.  · Loss of balance or difficulty walking.  · Weakness, numbness, or tingling in the arms or legs.  · Nausea or vomiting.  · Memory loss.  · Double vision.  · Increased sleepiness.  DIAGNOSIS  Your health care provider will perform a thorough physical and neurological exam. A CT scan or MRI may also be done. If there is blood on the scan, its color will help your health care provider determine how long the hemorrhage has been there.  TREATMENT  If the cause is an acute subdural hemorrhage, immediate treatment is needed. In many cases an emergency surgery is performed to drain accumulated blood or to remove the blood clot. Sometimes steroid or diuretic medicines or controlled breathing through a ventilator is needed to decrease pressure in the brain. This is especially true if there is any swelling of the brain.  If the  cause is a chronic subdural hemorrhage, treatment depends on a variety of factors. Sometimes no treatment is needed. If the subdural hematoma is small and causes minimal or no symptoms, you may be treated with bed rest, medicines, and observation. If the hemorrhage is large or if you have neurological symptoms, an emergency surgery is usually needed to remove the blood clot.  People who develop a subdural hemorrhage are at risk of developing seizures, even after the subdural hematoma has been treated. You may be prescribed an anti-seizure (anticonvulsant) medicine for a year or longer.  HOME CARE INSTRUCTIONS  · Only take medicines as directed by your health care provider.  · Rest if directed by your health care provider.  · Keep all follow-up appointments with your health care provider.  · If you play a contact sport such as football, hockey or soccer and you experienced a significant head injury, allow enough time for healing (up to 15 days) before you start playing again. A repeated injury that occurs during this fragile repair period is likely to result in hemorrhage. This is called the second impact syndrome.  SEEK IMMEDIATE MEDICAL CARE IF:  · You fall or experience minor trauma to your head and you are taking blood thinners. If you are on any blood thinners even a very small injury can cause a subdural hematoma. You should not hesitate to seek medical attention regardless of how minor you think your symptoms are.  · You experience a head injury and have:  ¨ Drowsiness or a decrease in alertness.  ¨ Confusion or forgetfulness.  ¨ Slurred speech.  ¨ Irrational or aggressive behavior.  ¨ Numbness or paralysis in any part of the body.  ¨ A feeling of being sick to your stomach (nauseous) or you throw up (vomit).  ¨ Difficulty walking or poor coordination.  ¨ Double vision.  ¨ Seizures.  ¨ A bleeding disorder.  ¨ A history of heavy alcohol use.  ¨ Clear fluid draining from your nose or ears.  ¨ Personality  changes.  ¨ Difficulty thinking.  ¨ Worsening symptoms.  MAKE SURE YOU:  · Understand these instructions.  · Will watch your condition.  · Will get help right away if you are not doing well or get worse.  FOR MORE INFORMATION  National [place] of Neurological Disorders and Stroke: www.ninds.nih.gov  American Association of Neurological Surgeons: www.neurosurgerytoday.org  American Academy of Neurology (AAN): www.aan.com  Brain Injury Association of [place]: www.biausa.org  This information is not intended to replace advice given to you by your health care provider. Make sure you discuss any questions you have with your health care provider.  Document Released: 11/04/2005 Document Revised: 10/08/2014 Document Reviewed: 06/20/2014  Elsevier Interactive Patient Education © 2017 Elsevier Inc.

## 2018-08-06 NOTE — PROGRESS NOTES
"  Trauma/Surgical Progress Note    Author: Lady HAMM Mari Date & Time created: 8/6/2018   12:09 PM     Interval Events:  HD # 1 - FAll, SZ, ICB  Tertiary complete - no further findings  RAP/SBIRT complete  Cog eval pending  Arora removed  Plan for home when cog complete and voiding  Dr. Mariano aware    Review of Systems   Constitutional: Negative.    Respiratory: Negative.    Genitourinary:        Voiding   Musculoskeletal: Negative.    Neurological: Negative for dizziness and headaches.   Psychiatric/Behavioral: Negative.    All other systems reviewed and are negative.    Hemodynamics:  Blood pressure 121/66, pulse (!) 57, temperature 36.8 °C (98.2 °F), resp. rate 16, height 1.676 m (5' 6\"), weight 67.3 kg (148 lb 5.9 oz), SpO2 96 %.     Respiratory:    Respiration: 16, Pulse Oximetry: 96 %           Fluids:    Intake/Output Summary (Last 24 hours) at 08/06/18 1209  Last data filed at 08/06/18 1000   Gross per 24 hour   Intake             2020 ml   Output             3175 ml   Net            -1155 ml     Admit Weight: 68 kg (150 lb)  Current Weight: 67.3 kg (148 lb 5.9 oz)    Physical Exam   Constitutional: He is oriented to person, place, and time. He appears well-developed and well-nourished. No distress.   HENT:   Small abrasions left head   Eyes: Conjunctivae are normal.   Neck: Normal range of motion.   Cardiovascular: Normal rate and regular rhythm.    Pulmonary/Chest: Effort normal. No respiratory distress.   Abdominal: Soft. He exhibits no distension.   Musculoskeletal: Normal range of motion.   Neurological: He is alert and oriented to person, place, and time.   Skin: Skin is warm and dry.   Psychiatric: He has a normal mood and affect.   Nursing note and vitals reviewed.      Medical Decision Making/Problem List:    Active Hospital Problems    Diagnosis   • Subdural hematoma (HCC) [I62.00]     Priority: High     Contusion of the left parietal scalp with a small contrecoup subdural hematoma of the right " frontal convexity. No significant local mass effect or midline shift.  Follow up CT -stable with some blossoming of contusions  Keppra x 7 days  Non-operative mgt  Franc Angeles MD. Neurosurgery.       • Seizure (HCC) [R56.9]     Priority: High     No known history  Admit BA 0.0  Likely precipitated by blunt head trauma  Keppra x7 days     • Contraindication to anticoagulation therapy [Z53.09]     Priority: Medium     Systemic anticoagulation contraindicated secondary to elevated bleeding risk.  Consider duplex if Lovenox not initiated within 48 hours of admission      • Trauma [T14.90XA]     Priority: Low     Found down seizing  Trauma Green activation.     • Schizophrenia (HCC) [F20.9]     Priority: Low     Chronic condition treated with Zoloft and Haldol.  Resumed maintenance medication.       Core Measures & Quality Metrics:  Labs reviewed, Medications reviewed and Radiology images reviewed  Arora catheter: No Arora      DVT Prophylaxis: Contraindicated - High bleeding risk    Ulcer prophylaxis: Not indicated    Assessed for rehab: Patient was assess for and/or received rehabilitation services during this hospitalization    Total Score: 6  ETOH Screening  CAGE Score: 4  Intervention complete date: 8/6/2018  Patient response to intervention: Denies.     Discussed patient condition with RN, Patient and trauma surgery. Dr. Zavala     Seen on rounds  Doing well  Ok to discharge home  Discussed with RN, RT, pharmacy, Neurosurgery and Lady Zavala MD

## 2018-08-06 NOTE — THERAPY
"Speech Language Therapy Evaluation completed to address cognition  Functional Status:  The patient was seen for cognitive-linguistic evaluation this date. The patient was awake, alert and verbose/tangential/loggorheic initially. With cues, patient able to maintain topic with improved accuracy. The patient was given a variety of cognitive-linguistic assessment tasks. The patient presented with minimal deficits in attention, thought organization, executive function and reasoning, with literal answers provided to reasoning and problem solving tasks. Memory, and reading comprehension tasks resulted in WFL for this setting. Given patient's report of and documented mental health at baseline, findings during cognitive evaluation may be baseline. Patient reported he has an appointment with a psychiatrist on Friday, Aug 10, 2018. Patient provided extensive written and verbal education regarding post acute follow up and need to notify Psychiatrist of TBI for closer evaluation and deliniation of post TBI vs mental health deficits. Patient verbalized clear understanding. No further skilled SLP services indicated at this time. Please re-consult SLP services with any change in status or SLP needs. Thank you.     Recommendations:  1) Follow up with Psychiatrist and notify of TBI for closer evaluation and deliniation of post TBI vs mental health deficits. Patient verbalized clear understanding. 2) No further skilled SLP services indicated at this time.  3) Please re-consult SLP services with any change in status or SLP needs.    Plan of Care: Patient with no further skilled SLP needs in the acute care setting at this time    Post-Acute Therapy: Currently anticipate no further skilled therapy needs once patient is discharged from the inpatient setting.    See \"Rehab Therapy-Acute\" Patient Summary Report for complete documentation.   "

## 2018-08-06 NOTE — CARE PLAN
Problem: Safety  Goal: Will remain free from injury  RN to orient Pt to call light and ensure Pt bed/chair alarm is on.  RN ensures to patiently remind impulsive Pt's of risk factors of removing any equipment or mobilizing without staff.       Problem: Infection  Goal: Will remain free from infection    Intervention: Assess signs and symptoms of infection  RN monitors Pt VS and lab values to observe for S/S of infection.  Hand hygiene implemented before and after Pt care.

## 2018-08-06 NOTE — PROGRESS NOTES
Safe keeping and security called to update on pt's pending discharge. Both will be up ASAP to return pt's belongings.

## 2018-08-06 NOTE — PROGRESS NOTES
Pt belongings collected from both safekeeping and security. Pt discharge plan thoroughly explained and areas of importance highlighted for reinforcement. Pt given bus ticket, discharged via wheelchair with keppra script and belongings in tow.

## 2018-08-06 NOTE — CARE PLAN
Problem: Knowledge Deficit  Goal: Knowledge of disease process/condition, treatment plan, diagnostic tests, and medications will improve  Outcome: PROGRESSING AS EXPECTED  RN to collaborate with pt on knowledge of current health status and treatment plan moving forward. RN to educate pt on all treatment such as medications, equipment (SCDs, call light, bed alarm), and ensure pt is able to teach back to verify and reinforce understanding.     Problem: Discharge Barriers/Planning  Goal: Patient's continuum of care needs will be met  Outcome: PROGRESSING AS EXPECTED  RN to collaborate with MDs during pt rounding on transitioning pt to oral medication (Keppra) to relieve barriers to discharge. RN to inquire if pt is currently taking other order medication and if he has access to this medication upon his discharge.

## 2018-08-17 ENCOUNTER — HOSPITAL ENCOUNTER (EMERGENCY)
Dept: HOSPITAL 8 - ED | Age: 63
Discharge: HOME | End: 2018-08-17
Payer: MEDICARE

## 2018-08-17 VITALS — WEIGHT: 159.17 LBS | BODY MASS INDEX: 24.98 KG/M2 | HEIGHT: 67 IN

## 2018-08-17 VITALS — SYSTOLIC BLOOD PRESSURE: 115 MMHG | DIASTOLIC BLOOD PRESSURE: 74 MMHG

## 2018-08-17 DIAGNOSIS — Z76.0: ICD-10-CM

## 2018-08-17 DIAGNOSIS — F20.9: Primary | ICD-10-CM

## 2018-08-17 PROCEDURE — 82962 GLUCOSE BLOOD TEST: CPT

## 2018-08-17 PROCEDURE — 99283 EMERGENCY DEPT VISIT LOW MDM: CPT

## 2018-09-03 ENCOUNTER — HOSPITAL ENCOUNTER (EMERGENCY)
Dept: HOSPITAL 8 - ED | Age: 63
Discharge: LEFT BEFORE DISCHARGE | End: 2018-09-03
Payer: MEDICARE

## 2018-09-03 DIAGNOSIS — F41.9: Primary | ICD-10-CM

## 2018-09-03 DIAGNOSIS — Z53.21: ICD-10-CM

## 2018-09-05 NOTE — ADDENDUM NOTE
Encounter addended by: Pablo Zavala M.D. on: 9/5/2018 11:07 AM<BR>    Actions taken: Sign clinical note

## 2018-09-19 ENCOUNTER — HOSPITAL ENCOUNTER (EMERGENCY)
Dept: HOSPITAL 8 - ED | Age: 63
Discharge: LEFT BEFORE DISCHARGE | End: 2018-09-19
Payer: MEDICARE

## 2018-09-19 ENCOUNTER — HOSPITAL ENCOUNTER (EMERGENCY)
Dept: HOSPITAL 8 - ED | Age: 63
Discharge: HOME | End: 2018-09-19
Payer: MEDICARE

## 2018-09-19 VITALS — WEIGHT: 153.88 LBS | HEIGHT: 67 IN | BODY MASS INDEX: 24.15 KG/M2

## 2018-09-19 VITALS — DIASTOLIC BLOOD PRESSURE: 68 MMHG | SYSTOLIC BLOOD PRESSURE: 106 MMHG

## 2018-09-19 DIAGNOSIS — F20.0: Primary | ICD-10-CM

## 2018-09-19 DIAGNOSIS — Z53.21: ICD-10-CM

## 2018-09-19 DIAGNOSIS — Z00.8: Primary | ICD-10-CM

## 2018-09-19 PROCEDURE — 99284 EMERGENCY DEPT VISIT MOD MDM: CPT
